# Patient Record
Sex: MALE | URBAN - METROPOLITAN AREA
[De-identification: names, ages, dates, MRNs, and addresses within clinical notes are randomized per-mention and may not be internally consistent; named-entity substitution may affect disease eponyms.]

---

## 2017-06-25 ENCOUNTER — TRANSFERRED RECORDS (OUTPATIENT)
Dept: HEALTH INFORMATION MANAGEMENT | Facility: CLINIC | Age: 57
End: 2017-06-25

## 2017-09-05 ENCOUNTER — TRANSFERRED RECORDS (OUTPATIENT)
Dept: HEALTH INFORMATION MANAGEMENT | Facility: CLINIC | Age: 57
End: 2017-09-05

## 2017-12-15 NOTE — TELEPHONE ENCOUNTER
APPT INFO    Date /Time: 12/20/17   Reason for Appt: Peripheral Neuropathy   Ref Provider/Clinic: [facility]   Are there internal records? Yes/No?  IF YES, list clinic names: No   Are there outside records? Yes/No? Yes  See Above   Patient Contact (Y/N) & Call Details: No referred   Action: Requested imaging     OUTSIDE RECORDS CHECKLIST     CLINIC NAME COMMENTS REC (x) IMG (x)   [facility]  x            Records Received From: [facility]    Date/Exam/Location  (specify location if different)   Office Notes: 10/13/17, 7/11/17, 4/10/17, 11/21/16   ED/Hosp Notes:    Radiology Reports: MR Brain 9/22/16   Procedure Notes:  (EMG/EEG/ECG/LP) EMG 9/22/16   Labs: Labs   Neuropsych Test    Genetic Testing:    Muscle Biopsy: Path Report 9/5/17   DBS Op Note:    Missing: Images

## 2017-12-16 ASSESSMENT — ENCOUNTER SYMPTOMS
TASTE DISTURBANCE: 0
EYE WATERING: 0
DECREASED APPETITE: 0
EYE REDNESS: 0
RECTAL PAIN: 1
BOWEL INCONTINENCE: 0
HYPOTENSION: 0
CHILLS: 1
EYE PAIN: 0
EXERCISE INTOLERANCE: 0
NIGHT SWEATS: 1
DIFFICULTY URINATING: 1
DYSURIA: 0
FATIGUE: 1
DOUBLE VISION: 1
JAUNDICE: 0
ORTHOPNEA: 0
HALLUCINATIONS: 0
POOR WOUND HEALING: 0
FEVER: 0
HYPERTENSION: 1
SORE THROAT: 0
ABDOMINAL PAIN: 0
NAUSEA: 0
POLYDIPSIA: 0
SINUS CONGESTION: 0
FLANK PAIN: 0
BLOATING: 1
LIGHT-HEADEDNESS: 0
SYNCOPE: 0
CONSTIPATION: 1
SLEEP DISTURBANCES DUE TO BREATHING: 0
NAIL CHANGES: 1
PALPITATIONS: 0
ALTERED TEMPERATURE REGULATION: 1
WEIGHT GAIN: 1
WEIGHT LOSS: 0
SKIN CHANGES: 0
BLOOD IN STOOL: 0
SMELL DISTURBANCE: 0
SINUS PAIN: 0
POLYPHAGIA: 0
INCREASED ENERGY: 0
VOMITING: 0
EYE IRRITATION: 0
DIARRHEA: 0
LEG PAIN: 1
NECK MASS: 0
HEMATURIA: 0
TROUBLE SWALLOWING: 0
HOARSE VOICE: 1
HEARTBURN: 0

## 2017-12-20 ENCOUNTER — PRE VISIT (OUTPATIENT)
Dept: NEUROLOGY | Facility: CLINIC | Age: 57
End: 2017-12-20

## 2017-12-20 ENCOUNTER — OFFICE VISIT (OUTPATIENT)
Dept: NEUROLOGY | Facility: CLINIC | Age: 57
End: 2017-12-20
Payer: COMMERCIAL

## 2017-12-20 VITALS
HEIGHT: 75 IN | OXYGEN SATURATION: 97 % | SYSTOLIC BLOOD PRESSURE: 141 MMHG | DIASTOLIC BLOOD PRESSURE: 91 MMHG | BODY MASS INDEX: 26.92 KG/M2 | HEART RATE: 75 BPM | WEIGHT: 216.5 LBS

## 2017-12-20 DIAGNOSIS — G60.9 IDIOPATHIC NEUROPATHY: Primary | ICD-10-CM

## 2017-12-20 DIAGNOSIS — G60.9 IDIOPATHIC NEUROPATHY: ICD-10-CM

## 2017-12-20 DIAGNOSIS — E53.1 VITAMIN B6 DEFICIENCY: ICD-10-CM

## 2017-12-20 LAB
GLIADIN IGA SER-ACNC: 1 U/ML
GLIADIN IGG SER-ACNC: 1 U/ML
HCV AB SERPL QL IA: NONREACTIVE
HIV 1+2 AB+HIV1 P24 AG SERPL QL IA: NONREACTIVE
TTG IGA SER-ACNC: 1 U/ML
TTG IGG SER-ACNC: <1 U/ML

## 2017-12-20 RX ORDER — IBUPROFEN 200 MG
400 TABLET ORAL 2 TIMES DAILY PRN
COMMUNITY

## 2017-12-20 RX ORDER — POLYETHYLENE GLYCOL 3350 17 G/17G
8.5 POWDER, FOR SOLUTION ORAL
COMMUNITY
Start: 2017-03-28

## 2017-12-20 RX ORDER — ASPIRIN 325 MG
325 TABLET, DELAYED RELEASE (ENTERIC COATED) ORAL DAILY
COMMUNITY
Start: 2015-02-07

## 2017-12-20 RX ORDER — AMLODIPINE BESYLATE 2.5 MG/1
2.5 TABLET ORAL DAILY
COMMUNITY
Start: 2017-05-22

## 2017-12-20 RX ORDER — AZITHROMYCIN 500 MG/1
2 TABLET, FILM COATED ORAL DAILY
COMMUNITY
Start: 2017-10-11

## 2017-12-20 ASSESSMENT — PAIN SCALES - GENERAL: PAINLEVEL: NO PAIN (0)

## 2017-12-20 NOTE — MR AVS SNAPSHOT
After Visit Summary   12/20/2017    Rosalio Beard    MRN: [id number]           Patient Information     Date Of Birth          1960        Visit Information        Provider Department      12/20/2017 7:50 AM Gino Chavarria MD [facility]        Today's Diagnoses     Idiopathic neuropathy    -  1      Care Instructions    Your neuropathy is likely idiopathic (unexplained). The only way to know this is due to B6 deficiency with certainty, is seeing improvement after taking B6, and this apparently did not happen.  I would like you to do a few more blood tests for the neuropathy today that weren't done before-including testing for hepatitis C and HIV (unlikely), celiac disease/gluten sensitivity, and repeating the B6 level.  If your B6 level is low, you should take the vitamin.    There are other medications besides nortriptyline and gabapentin that can be used for nerve pain. I will write a letter to [facility] discussing those options.  Please make a follow up appointment with a Neurologist in [place]. Please call  if you have any questions or concerns.             Follow-ups after your visit        Future tests that were ordered for you today     Open Future Orders        Priority Expected Expires Ordered    Hepatitis C antibody Routine  12/20/2018 12/20/2017    HIV Antigen Antibody Combo Routine  12/20/2018 12/20/2017    Tissue transglutaminase charlene IgA and IgG Routine  12/20/2018 12/20/2017    Deamidated Giladin Peptide Charlene IgA IgG Routine  12/21/2018 12/20/2017    Vitamin B6 Routine  12/20/2018 12/20/2017            Who to contact     Please call your clinic at [phone] to:    Ask questions about your health    Make or cancel appointments    Discuss your medicines    Learn about your test results    Speak to your doctor   If you have compliments or concerns about an experience at your clinic, or if you wish to file a complaint, please contact [facility]  "[place] [facility] Patient Relations at [phone] or email us at [email or web address]         Additional Information About Your Visit        [employer] Information     [employer] gives you secure access to your electronic health record. If you see a primary care provider, you can also send messages to your care team and make appointments. If you have questions, please call your primary care clinic.  If you do not have a primary care provider, please call [phone] and they will assist you.      [employer] is an electronic gateway that provides easy, online access to your medical records. With [employer], you can request a clinic appointment, read your test results, renew a prescription or communicate with your care team.     To access your existing account, please contact your [facility] or call [phone] for assistance.        Care EveryWhere ID     This is your Care EveryWhere ID. This could be used by other organizations to access your [facility] medical records  [id number]        Your Vitals Were     Pulse Height Pulse Oximetry BMI (Body Mass Index)          75 1.905 m (6' 3\") 97% 27.06 kg/m2         Blood Pressure from Last 3 Encounters:   12/20/17 (!) 141/91    Weight from Last 3 Encounters:   12/20/17 98.2 kg (216 lb 8 oz)               Primary Care Provider Office Phone # Fax #    Herb Palacios [phone]       [address] [place] [address]        Equal Access to Services     KEN RODRIGUEZ AH: Hadii arthur fernandeso Sobarryali, waaxda luqadaha, qaybta kaalmada adeegyada, sara house. So [facility] [phone].    ATENCIÓN: Si habla español, tiene a rubi disposición servicios gratuitos de asistencia lingüística. Llame al [phone].    We comply with applicable federal civil rights laws and [place] laws. We do not discriminate on the basis of race, color, national origin, age, disability, sex, sexual " orientation, or gender identity.            Thank you!     Thank you for choosing [facility]  for your care. Our goal is always to provide you with excellent care. Hearing back from our patients is one way we can continue to improve our services. Please take a few minutes to complete the written survey that you may receive in the mail after your visit with us. Thank you!             Your Updated Medication List - Protect others around you: Learn how to safely use, store and throw away your medicines at www.disposemymeds.org.          This list is accurate as of: 12/20/17  8:40 AM.  Always use your most recent med list.                   Brand Name Dispense Instructions for use Diagnosis    amLODIPine 2.5 MG tablet    NORVASC     Take 2.5 mg by mouth daily        aspirin 325 MG EC tablet      Take 325 mg by mouth daily        azithromycin 500 MG tablet    ZITHROMAX     Take 2 tablets by mouth daily        ibuprofen 200 MG tablet    ADVIL/MOTRIN     Take 400 mg by mouth 2 times daily as needed        polyethylene glycol 0.4%- propylene glycol 0.3% 0.4-0.3 % Soln ophthalmic solution    SYSTANE ULTRA     Apply 1-2 drops to eye every 4 hours as needed        polyethylene glycol powder    MIRALAX/GLYCOLAX     Take 8.5 g by mouth

## 2017-12-20 NOTE — PROGRESS NOTES
2017      Rosalio Freeman MD   [facility]    [facility] [address], [address]   [place] [address]      RE: Rosalio Beard   MRN: [id number]   : 1960      Dear Dr. Freeman:       I had the pleasure to see Mr. Beard at the [facility] today.  Thank you for this referral.  As you know, he is a 57-year-old man who developed symptoms of neuropathy around the middle of .  He had an aortic valve replacement in 2014 and postoperatively developed atrial flutter.  This was cardioverted to sinus rhythm and subsequently he underwent an ablation procedure in 2015 which worked.  He was on anticoagulation for a few months but then it was discontinued.  He is not entirely sure when exactly the neuropathy symptoms began, but it was probably around the mid of .  It began with an uncomfortable sensation first in his right foot and within a couple of months the left foot, which over time gradually ascended to 2/3 of the distance between the ankle and the knees bilaterally.  The symptoms are now symmetric.  It is numbness, tingling, a sore or a swollen sensation.  At the height of his disease, he developed some hand paresthesias or pain in early .     He had an extensive workup in [place].  This included an EMG that I reviewed and showed length dependent sensorimotor axonal neuropathy.  There was no evidence of demyelination.  He had extensive serological workup ordered by Orlin Flores and Pete including Lyme serologies, TSH, vitamin B12, serum protein electrophoresis, vitamin B1, sed rate, magnesium, MICHELLE, ANCA, cryoglobulin, sulfatide antibody, rheumatoid factor, Sjogren syndrome antibodies, hemoglobin A1c, 24 hour heavy metal panel, CCP, ANDREY and double stranded DNA antibodies.  All those were negative or normal.  Vitamin B6 level was found to be low at 7 mcg/L on 2016 and the patient received replacement.  The vitamin B6 level yaa to 105 on  10/28/2016.  I asked the patient whether in retrospect the vitamin B6 replacement helped him and he is not sure that he had any sustained positive effect from that.  He stopped taking the B6 and multivitamins at his own initiative 3 months ago. He also had a brain MRI in 9/2016 showing two cerebral microbleeds, one cerebellar and one in the deep white matter of the right hemisphere. Although the distribution of those microbleeds is not typical for cerebral amyloid angiopathy, the possibility of amyloidosis was raised and subsequently the patient underwent a fat pad biopsy. This was initially read as positive but after subsequent Hematology Consultation the pathology report was revisited to negative. A sural nerve biopsy done 9/2017 and sent to [facility] showed mild to moderate axon loss, NO mentioning about amyloid, and small epineural perivascular inflammatory collections of uncertain significance, without definite vasculitis.    In terms of relevant history to the neuropathy, not much transpired from our interview today.  The patient reported night sweats that were happening very frequently until about 30 days ago, but they do not occur anymore.  He describes good appetite and no weight loss.  In fact, he has gained a little weight.  He has not had no blood in his urine or stool.  No lumps in his throat or other signs suspicious for malignancy.  No unexplained fevers.  He has chronic constipation which he treats with MiraLax but no recent change in bowel habits. He denies recent skin changes or rashes, swollen joints, oral sores, unusual alopecia pattern, pleuritis, pericarditis, kidney disease or anything else to suggest connective tissue disorder.  He did have cold hands and feet at some point but he denied bluish or white discoloration upon cold exposure so the symptoms were not typical for Raynaud.  In any case, his cardiologist switched him from lisinopril to amlodipine and this coldness improved.  He  denies orthostatic hypotension/syncope, signs of gastroparesis, incontinence of bowel or bladder, dry eyes or dry mouth.  He has mild erectile dysfunction in the past few years.  No history of diabetes, thyroid disease, cancer, radiation or chemotherapy.  He has absolutely no family history of neuropathy.  He had a remote history of cardiomyopathy in one grandmother who  relatively young.  He feels that his sensory symptoms of the legs have stopped progressing over the last approximately 12 months.     His neuropathic discomfort was treated initially with nortriptyline 25 mg b.i.d. which did not help much and subsequently he was switched to gabapentin.  He experienced edema on his ankles with the higher gabapentin dose and he was instructed by his doctor to be tapered.  He discontinued it a few months ago but the edema still persists to some degree, although it is better.      PAST MEDICAL HISTORY, ALLERGIES, FAMILY HISTORY, SOCIAL HISTORY, REVIEW OF SYSTEMS AND MEDICATIONS:  As outlined in your note.  He is not on any neurotoxic medication currently.  He did not receive flecainide or amiodarone around the time of cardioversion from what he can recall.  He drinks alcohol less than 1 alcoholic drink a week.  No illicit drug use.  He does not smoke.  He works in an office job behind a computer.      REVIEW OF SYSTEMS:   See attached form.        PHYSICAL EXAMINATION:   VITAL SIGNS:   His blood pressure is 141/99.  Pulse 75 and regular.  O2 sat 97% on room air.  Weight 98.2 kilos.  Height is 1.9 meters.  O2 sat 97% on room air.  Today he endorsed no pain.        NEUROLOGIC:   He is awake, alert, oriented x3.  Cranial nerve examination II-XII were normal.  Motor exam shows normal muscle strength, tone and bulk throughout.  Reflexes were 2 in biceps, triceps, brachioradialis, 1+ at the knees requiring jendrassik maneuver and 2+ at the ankles.  Plantar responses were bilaterally neutral or flexor.  Sensory exam was  remarkably normal, given the diagnosis.  His vibration was 11 seconds at the right great toe (normal), 7-8 seconds at the left great toe (mildly reduced), more than 11 seconds at the medial malleoli and 20 at the index fingers (normal).  Position sensation was normal.  Frankly I was not able to appreciate any definite loss of pinprick or tactile sensation in the toes, feet, calves or upper extremities.  There was no definite distal to proximal gradient.  He had no spasticity or rigidity.  Finger-to-nose and heel-to-shin were done without dysmetria.  There was no tremor or adventitious movements.  He was able to rise from a chair with arms crossed on the chest without difficulties.  Romberg sign was negative.  He could even walk tandem a few steps without major difficulties and could stand on heels and toes.  There was no focal muscle atrophy or fasciculations.  I did see mild pitting edema at the lower 3rd of his calf bilaterally.        IMPRESSION:   Idiopathic neuropathy or perhaps related to B6 deficiency.        I spent about 45 minutes with Mr. Beard of which more than half was counseling.  I told him that in a good 30%-50% of adults with mild length dependent sensory neuropathy, a cause is never found. I could not elicit any useful information from his history to point to a cause. He had some night sweats which may be concerning for cancers or other systemic diseases, but those seem to be resolving, his appetite and weight are good, and his inflammatory markers were always normal.  Despite very low likelihood of malignancy I will order a limited paraneoplastic antibody panel (Hu, CRMP5, etc). I will also check immunofixation because it is more sensitive than electrophoresis to  a monoclonal protein in blood.    I told him that the only way one can be confident his neuropathy is due to B6 deficiency, is to observe improvement of the neuropathy after administering the vitamin and since Mr. Beard is not  confident that this was the case, we cannot make a clear association.  I would, however, like to repeat a B6 level today and if it is low the vitamin should be replaced.  I would also check him for celiac disease serologies including tissue transglutaminase and deamidate gliadin antibodies and check hepatitis C and HIV serologies which have not been ordered previously. If everything is negative, he should follow with a neurologist in [place].      I reassured him that since he has not noticed any substantial progression of his neuropathic symptoms in the last year, even after discontinuing the B6, the chance that this will continue getting worse is very low and one should focus on symptomatic management.  The option of Cymbalta, tramadol or other medications may be explored if he cannot tolerate gabapentin or nortriptyline well.  I asked him to talk to his primary care provider about possible causes of leg edema and management of this.     Lastly, I want to highlight two things. 1. This patient does NOT have amyloidosis, because: First of all, the location of MRI brain lesions and age are not typical for amyloid angiopathy. Second, and most important, cerebral amyloid angiopathy is NOT related to peripheral neuropathy. Third, amyloid neuropathy is either familial (due to TTR or other mutations), or associated with monoclonal protein in blood and lamda light chains. None of the two is the case based on the medical record and history. Fourth, nerve biopsy did not show amyloid. Fat pad biopsies are plagued with false positive results, and I have personally had this experience twice now.   2. The significance of minor epineurial perivascular inflammatory collections on nerve biopsies is uncertain. I do not treat patients with steroids or IVIG for this finding unless the biopsy shows vasculitis, and that is not the case.     Thank you for allowing me to participate in his care.       Sincerely,       Gino  MD Aura      ADDENDUM (2017): All lab results ordered were normal (B6, immunofixation, Hu, Yo, Ri antibodies, celiac disease serologies, HCV, HIV antibodies). I called patient and communicated those negative results. He will follow up at [place]. His neuropathy is idiopathic.   cc:   [place] [address]         TASHA RUBIO MD             D: 2017 08:48   T: 2017 09:18   MT: AKA      Name:     LUCAS LAWRENCE   MRN:      [id number]        Account:      [id number]   :      1960           Service Date: 2017      Document: [id number]      Answers for HPI/ROS submitted by the patient on 2017   General Symptoms: Yes  Skin Symptoms: Yes  HENT Symptoms: Yes  EYE SYMPTOMS: Yes  HEART SYMPTOMS: Yes  LUNG SYMPTOMS: No  INTESTINAL SYMPTOMS: Yes  URINARY SYMPTOMS: Yes  REPRODUCTIVE SYMPTOMS: Yes  SKELETAL SYMPTOMS: No  BLOOD SYMPTOMS: No  NERVOUS SYSTEM SYMPTOMS: No  MENTAL HEALTH SYMPTOMS: No  Fever: No  Loss of appetite: No  Weight loss: No  Weight gain: Yes  Fatigue: Yes  Night sweats: Yes  Chills: Yes  Increased stress: No  Excessive hunger: No  Excessive thirst: No  Feeling hot or cold when others believe the temperature is normal: Yes  Loss of height: No  Post-operative complications: No  Surgical site pain: No  Hallucinations: No  Change in or Loss of Energy: No  Hyperactivity: No  Confusion: No  Changes in hair: No  Changes in moles/birth marks: No  Itching: No  Rashes: No  Changes in nails: Yes  Acne: No  Change in facial hair: No  Warts: No  Non-healing sores: No  Scarring: No  Flaking of skin: No  Color changes of hands/feet in cold : No  Sun sensitivity: No  Skin thickening: No  Ear pain: No  Ear discharge: No  Hearing loss: No  Tinnitus: No  Nosebleeds: No  Congestion: No  Sinus pain: No  Trouble swallowing: No   Voice hoarseness: Yes  Mouth sores: No  Sore throat: No  Tooth pain: No  Gum  tenderness: No  Bleeding gums: No  Change in taste: No  Change in sense of smell: No  Dry mouth: No  Hearing aid used: No  Neck lump: No  Eye pain: No  Vision loss: No  Dry eyes: No  Watery eyes: No  Eye bulging: No  Double vision: Yes  Flashing of lights: No  Spots: No  Floaters: No  Redness: No  Crossed eyes: No  Tunnel Vision: No  Yellowing of eyes: No  Eye irritation: No  Chest pain or pressure: No  Fast or irregular heartbeat: No  Pain in legs with walking: Yes  Trouble breathing while lying down: No  Fingers or toes appear blue: Yes  High blood pressure: Yes  Low blood pressure: No  Fainting: No  Murmurs: No  Pacemaker: No  Varicose veins: No  Edema or swelling: Yes  Wake up at night with shortness of breath: No  Light-headedness: No  Exercise intolerance: No  Heart burn or indigestion: No  Nausea: No  Vomiting: No  Abdominal pain: No  Bloating: Yes  Constipation: Yes  Diarrhea: No  Blood in stool: No  Black stools: No  Rectal or Anal pain: Yes  Fecal incontinence: No  Yellowing of skin or eyes: No  Vomit with blood: No  Change in stools: No  Trouble holding urine or incontinence: No  Pain or burning: No  Trouble starting or stopping: Yes  Increased frequency of urination: No  Blood in urine: No  Decreased frequency of urination: No  Frequent nighttime urination: No  Flank pain: No  Difficulty emptying bladder: Yes  Scrotal pain or swelling: No  Erectile dysfunction: No  Penile discharge: No  Genital ulcers: No  Reduced libido: Yes

## 2017-12-20 NOTE — PATIENT INSTRUCTIONS
Your neuropathy is likely idiopathic (unexplained). The only way to know this is due to B6 deficiency with certainty, is seeing improvement after taking B6, and this apparently did not happen.  I would like you to do a few more blood tests for the neuropathy today that weren't done before-including testing for hepatitis C and HIV (unlikely), celiac disease/gluten sensitivity, and repeating the B6 level.  If your B6 level is low, you should take the vitamin.    There are other medications besides nortriptyline and gabapentin that can be used for nerve pain. I will write a letter to [facility] discussing those options.  Please make a follow up appointment with a Neurologist in [place]. Please call  if you have any questions or concerns.

## 2017-12-20 NOTE — LETTER
2017       RE: Rosalio Beard  [address] [facility] [address]     Dear Colleague,    Thank you for referring your patient, Rosalio Beard, to the [facility]UR[facility] at [facility]. Please see a copy of my visit note below.    2017      Rosalio Freeman MD   [facility]    [address] [place] [address], [address]   [place] [address]      RE: Rosalio Beard   MRN: [id number]   : 1960      Dear Dr. Freeman:       I had the pleasure to see Mr. Beard at the [facility] today.  Thank you for this referral.  As you know, he is a 57-year-old man who developed symptoms of neuropathy around the middle of .  He had an aortic valve replacement in 2014 and postoperatively developed atrial flutter.  This was cardioverted to sinus rhythm and subsequently he underwent an ablation procedure in 2015 which worked.  He was on anticoagulation for a few months but then it was discontinued.  He is not entirely sure when exactly the neuropathy symptoms began, but it was probably around the mid of .  It began with an uncomfortable sensation first in his right foot and within a couple of months the left foot, which over time gradually ascended to 2/3 of the distance between the ankle and the knees bilaterally.  The symptoms are now symmetric.  It is numbness, tingling, a sore or a swollen sensation.  At the height of his disease, he developed some hand paresthesias or pain in early .     He had an extensive workup in [place].  This included an EMG that I reviewed and showed length dependent sensorimotor axonal neuropathy.  There was no evidence of demyelination.  He had extensive serological workup ordered by Orlin Flores and Pete including Lyme serologies, TSH, vitamin B12, serum protein electrophoresis, vitamin B1, sed rate, magnesium, MICHELLE, ANCA, cryoglobulin, sulfatide antibody, rheumatoid factor, Sjogren syndrome  antibodies, hemoglobin A1c, 24 hour heavy metal panel, CCP, ANDREY and double stranded DNA antibodies.  All those were negative or normal.  Vitamin B6 level was found to be low at 7 mcg/L on 09/01/2016 and the patient received replacement.  The vitamin B6 level yaa to 105 on 10/28/2016.  I asked the patient whether in retrospect the vitamin B6 replacement helped him and he is not sure that he had any sustained positive effect from that.  He stopped taking the B6 and multivitamins at his own initiative 3 months ago. He also had a brain MRI in 9/2016 showing two cerebral microbleeds, one cerebellar and one in the deep white matter of the right hemisphere. Although the distribution of those microbleeds is not typical for cerebral amyloid angiopathy, the possibility of amyloidosis was raised and subsequently the patient underwent a fat pad biopsy. This was initially read as positive but after subsequent Hematology Consultation the pathology report was revisited to negative. A sural nerve biopsy done 9/2017 and sent to [facility] showed mild to moderate axon loss, NO mentioning about amyloid, and small epineural perivascular inflammatory collections of uncertain significance, without definite vasculitis.    In terms of relevant history to the neuropathy, not much transpired from our interview today.  The patient reported night sweats that were happening very frequently until about 30 days ago, but they do not occur anymore.  He describes good appetite and no weight loss.  In fact, he has gained a little weight.  He has not had no blood in his urine or stool.  No lumps in his throat or other signs suspicious for malignancy.  No unexplained fevers.  He has chronic constipation which he treats with MiraLax but no recent change in bowel habits. He denies recent skin changes or rashes, swollen joints, oral sores, unusual alopecia pattern, pleuritis, pericarditis, kidney disease or anything else to suggest connective tissue  disorder.  He did have cold hands and feet at some point but he denied bluish or white discoloration upon cold exposure so the symptoms were not typical for Raynaud.  In any case, his cardiologist switched him from lisinopril to amlodipine and this coldness improved.  He denies orthostatic hypotension/syncope, signs of gastroparesis, incontinence of bowel or bladder, dry eyes or dry mouth.  He has mild erectile dysfunction in the past few years.  No history of diabetes, thyroid disease, cancer, radiation or chemotherapy.  He has absolutely no family history of neuropathy.  He had a remote history of cardiomyopathy in one grandmother who  relatively young.  He feels that his sensory symptoms of the legs have stopped progressing over the last approximately 12 months.     His neuropathic discomfort was treated initially with nortriptyline 25 mg b.i.d. which did not help much and subsequently he was switched to gabapentin.  He experienced edema on his ankles with the higher gabapentin dose and he was instructed by his doctor to be tapered.  He discontinued it a few months ago but the edema still persists to some degree, although it is better.      PAST MEDICAL HISTORY, ALLERGIES, FAMILY HISTORY, SOCIAL HISTORY, REVIEW OF SYSTEMS AND MEDICATIONS:  As outlined in your note.  He is not on any neurotoxic medication currently.  He did not receive flecainide or amiodarone around the time of cardioversion from what he can recall.  He drinks alcohol less than 1 alcoholic drink a week.  No illicit drug use.  He does not smoke.  He works in an office job behind a computer.      REVIEW OF SYSTEMS:   See attached form.        PHYSICAL EXAMINATION:   VITAL SIGNS:   His blood pressure is 141/99.  Pulse 75 and regular.  O2 sat 97% on room air.  Weight 98.2 kilos.  Height is 1.9 meters.  O2 sat 97% on room air.  Today he endorsed no pain.        NEUROLOGIC:   He is awake, alert, oriented x3.  Cranial nerve examination II-XII were  normal.  Motor exam shows normal muscle strength, tone and bulk throughout.  Reflexes were 2 in biceps, triceps, brachioradialis, 1+ at the knees requiring jendrassik maneuver and 2+ at the ankles.  Plantar responses were bilaterally neutral or flexor.  Sensory exam was remarkably normal, given the diagnosis.  His vibration was 11 seconds at the right great toe (normal), 7-8 seconds at the left great toe (mildly reduced), more than 11 seconds at the medial malleoli and 20 at the index fingers (normal).  Position sensation was normal.  Frankly I was not able to appreciate any definite loss of pinprick or tactile sensation in the toes, feet, calves or upper extremities.  There was no definite distal to proximal gradient.  He had no spasticity or rigidity.  Finger-to-nose and heel-to-shin were done without dysmetria.  There was no tremor or adventitious movements.  He was able to rise from a chair with arms crossed on the chest without difficulties.  Romberg sign was negative.  He could even walk tandem a few steps without major difficulties and could stand on heels and toes.  There was no focal muscle atrophy or fasciculations.  I did see mild pitting edema at the lower 3rd of his calf bilaterally.        IMPRESSION:   Idiopathic neuropathy or perhaps related to B6 deficiency.        I spent about 45 minutes with Mr. Beard of which more than half was counseling.  I told him that in a good 30%-50% of adults with mild length dependent sensory neuropathy, a cause is never found. I could not elicit any useful information from his history to point to a cause. He had some night sweats which may be concerning for cancers or other systemic diseases, but those seem to be resolving, his appetite and weight are good, and his inflammatory markers were always normal.  Despite very low likelihood of malignancy I will order a limited paraneoplastic antibody panel (Hu, CRMP5, etc). I will also check immunofixation because it is more  sensitive than electrophoresis to  a monoclonal protein in blood.    I told him that the only way one can be confident his neuropathy is due to B6 deficiency, is to observe improvement of the neuropathy after administering the vitamin and since Mr. Beard is not confident that this was the case, we cannot make a clear association.  I would, however, like to repeat a B6 level today and if it is low the vitamin should be replaced.  I would also check him for celiac disease serologies including tissue transglutaminase and deamidate gliadin antibodies and check hepatitis C and HIV serologies which have not been ordered previously. If everything is negative, he should follow with a neurologist in [place].      I reassured him that since he has not noticed any substantial progression of his neuropathic symptoms in the last year, even after discontinuing the B6, the chance that this will continue getting worse is very low and one should focus on symptomatic management.  The option of Cymbalta, tramadol or other medications may be explored if he cannot tolerate gabapentin or nortriptyline well.  I asked him to talk to his primary care provider about possible causes of leg edema and management of this.     Lastly, I want to highlight two things. 1. This patient does NOT have amyloidosis, because: First of all, the location of MRI brain lesions and age are not typical for amyloid angiopathy. Second, and most important, cerebral amyloid angiopathy is NOT related to peripheral neuropathy. Third, amyloid neuropathy is either familial (due to TTR or other mutations), or associated with monoclonal protein in blood and lamda light chains. None of the two is the case based on the medical record and history. Fourth, nerve biopsy did not show amyloid. Fat pad biopsies are plagued with false positive results, and I have personally had this experience twice now.   2. The significance of minor epineurial perivascular  inflammatory collections on nerve biopsies is uncertain. I do not treat patients with steroids or IVIG for this finding unless the biopsy shows vasculitis, and that is not the case.         D: 2017 08:48   T: 2017 09:18   MT: PATIENCE      Name:     LUCAS LAWRENCE   MRN:      [id number]        Account:      [id number]   :      1960           Service Date: 2017      Document: [id number]      Answers for HPI/ROS submitted by the patient on 2017   General Symptoms: Yes  Skin Symptoms: Yes  HENT Symptoms: Yes  EYE SYMPTOMS: Yes  HEART SYMPTOMS: Yes  LUNG SYMPTOMS: No  INTESTINAL SYMPTOMS: Yes  URINARY SYMPTOMS: Yes  REPRODUCTIVE SYMPTOMS: Yes  SKELETAL SYMPTOMS: No  BLOOD SYMPTOMS: No  NERVOUS SYSTEM SYMPTOMS: No  MENTAL HEALTH SYMPTOMS: No  Fever: No  Loss of appetite: No  Weight loss: No  Weight gain: Yes  Fatigue: Yes  Night sweats: Yes  Chills: Yes  Increased stress: No  Excessive hunger: No  Excessive thirst: No  Feeling hot or cold when others believe the temperature is normal: Yes  Loss of height: No  Post-operative complications: No  Surgical site pain: No  Hallucinations: No  Change in or Loss of Energy: No  Hyperactivity: No  Confusion: No  Changes in hair: No  Changes in moles/birth marks: No  Itching: No  Rashes: No  Changes in nails: Yes  Acne: No  Change in facial hair: No  Warts: No  Non-healing sores: No  Scarring: No  Flaking of skin: No  Color changes of hands/feet in cold : No  Sun sensitivity: No  Skin thickening: No  Ear pain: No  Ear discharge: No  Hearing loss: No  Tinnitus: No  Nosebleeds: No  Congestion: No  Sinus pain: No  Trouble swallowing: No   Voice hoarseness: Yes  Mouth sores: No  Sore throat: No  Tooth pain: No  Gum tenderness: No  Bleeding gums: No  Change in taste: No  Change in sense of smell: No  Dry mouth: No  Hearing aid used: No  Neck lump: No  Eye pain: No  Vision loss: No  Dry eyes: No  Watery eyes: No  Eye bulging: No  Double vision: Yes  Flashing  of lights: No  Spots: No  Floaters: No  Redness: No  Crossed eyes: No  Tunnel Vision: No  Yellowing of eyes: No  Eye irritation: No  Chest pain or pressure: No  Fast or irregular heartbeat: No  Pain in legs with walking: Yes  Trouble breathing while lying down: No  Fingers or toes appear blue: Yes  High blood pressure: Yes  Low blood pressure: No  Fainting: No  Murmurs: No  Pacemaker: No  Varicose veins: No  Edema or swelling: Yes  Wake up at night with shortness of breath: No  Light-headedness: No  Exercise intolerance: No  Heart burn or indigestion: No  Nausea: No  Vomiting: No  Abdominal pain: No  Bloating: Yes  Constipation: Yes  Diarrhea: No  Blood in stool: No  Black stools: No  Rectal or Anal pain: Yes  Fecal incontinence: No  Yellowing of skin or eyes: No  Vomit with blood: No  Change in stools: No  Trouble holding urine or incontinence: No  Pain or burning: No  Trouble starting or stopping: Yes  Increased frequency of urination: No  Blood in urine: No  Decreased frequency of urination: No  Frequent nighttime urination: No  Flank pain: No  Difficulty emptying bladder: Yes  Scrotal pain or swelling: No  Erectile dysfunction: No  Penile discharge: No  Genital ulcers: No  Reduced libido: Yes        Thank you for allowing me to participate in his care.       Sincerely,       Gino Chavarria MD      cc:   Herb Palacios MD   [place] [phone]

## 2017-12-20 NOTE — NURSING NOTE
Chief Complaint   Patient presents with     Consult     [facility] - NEUROPATHY     Kalani Queen MA

## 2017-12-21 LAB
IGA SERPL-MCNC: 183 MG/DL (ref 70–380)
IGG SERPL-MCNC: 976 MG/DL (ref 695–1620)
IGM SERPL-MCNC: 78 MG/DL (ref 60–265)
PROT PATTERN SERPL IFE-IMP: NORMAL

## 2017-12-23 LAB — VIT B6 SERPL-MCNC: 36.9 NMOL/L (ref 20–125)

## 2017-12-28 LAB — HU AB SER QL: NORMAL

## 2020-03-11 ENCOUNTER — HEALTH MAINTENANCE LETTER (OUTPATIENT)
Age: 60
End: 2020-03-11

## 2020-12-27 ENCOUNTER — HEALTH MAINTENANCE LETTER (OUTPATIENT)
Age: 60
End: 2020-12-27

## 2021-04-25 ENCOUNTER — HEALTH MAINTENANCE LETTER (OUTPATIENT)
Age: 61
End: 2021-04-25

## 2021-10-09 ENCOUNTER — HEALTH MAINTENANCE LETTER (OUTPATIENT)
Age: 61
End: 2021-10-09

## 2022-05-21 ENCOUNTER — HEALTH MAINTENANCE LETTER (OUTPATIENT)
Age: 62
End: 2022-05-21

## 2022-09-17 ENCOUNTER — HEALTH MAINTENANCE LETTER (OUTPATIENT)
Age: 62
End: 2022-09-17

## 2023-06-04 ENCOUNTER — HEALTH MAINTENANCE LETTER (OUTPATIENT)
Age: 63
End: 2023-06-04